# Patient Record
Sex: MALE | URBAN - METROPOLITAN AREA
[De-identification: names, ages, dates, MRNs, and addresses within clinical notes are randomized per-mention and may not be internally consistent; named-entity substitution may affect disease eponyms.]

---

## 2020-08-12 ENCOUNTER — APPOINTMENT (RX ONLY)
Dept: URBAN - METROPOLITAN AREA CLINIC 37 | Facility: CLINIC | Age: 70
Setting detail: DERMATOLOGY
End: 2020-08-12

## 2020-08-12 DIAGNOSIS — L82.1 OTHER SEBORRHEIC KERATOSIS: ICD-10-CM

## 2020-08-12 DIAGNOSIS — D485 NEOPLASM OF UNCERTAIN BEHAVIOR OF SKIN: ICD-10-CM

## 2020-08-12 DIAGNOSIS — D22 MELANOCYTIC NEVI: ICD-10-CM

## 2020-08-12 DIAGNOSIS — D18.0 HEMANGIOMA: ICD-10-CM

## 2020-08-12 PROBLEM — D48.5 NEOPLASM OF UNCERTAIN BEHAVIOR OF SKIN: Status: ACTIVE | Noted: 2020-08-12

## 2020-08-12 PROBLEM — D22.5 MELANOCYTIC NEVI OF TRUNK: Status: ACTIVE | Noted: 2020-08-12

## 2020-08-12 PROBLEM — D18.01 HEMANGIOMA OF SKIN AND SUBCUTANEOUS TISSUE: Status: ACTIVE | Noted: 2020-08-12

## 2020-08-12 PROCEDURE — 99203 OFFICE O/P NEW LOW 30 MIN: CPT | Mod: 25

## 2020-08-12 PROCEDURE — ? BIOPSY BY SHAVE METHOD

## 2020-08-12 PROCEDURE — 11102 TANGNTL BX SKIN SINGLE LES: CPT

## 2020-08-12 PROCEDURE — ? COUNSELING

## 2020-08-12 ASSESSMENT — LOCATION DETAILED DESCRIPTION DERM
LOCATION DETAILED: RIGHT INFERIOR MEDIAL MIDBACK
LOCATION DETAILED: RIGHT SUPERIOR UPPER BACK
LOCATION DETAILED: RIGHT SUPERIOR FOREHEAD
LOCATION DETAILED: LEFT MID-UPPER BACK

## 2020-08-12 ASSESSMENT — LOCATION SIMPLE DESCRIPTION DERM
LOCATION SIMPLE: RIGHT FOREHEAD
LOCATION SIMPLE: RIGHT UPPER BACK
LOCATION SIMPLE: LEFT UPPER BACK
LOCATION SIMPLE: RIGHT LOWER BACK

## 2020-08-12 ASSESSMENT — LOCATION ZONE DERM
LOCATION ZONE: TRUNK
LOCATION ZONE: FACE

## 2020-08-24 ENCOUNTER — APPOINTMENT (RX ONLY)
Dept: URBAN - METROPOLITAN AREA CLINIC 38 | Facility: CLINIC | Age: 70
Setting detail: DERMATOLOGY
End: 2020-08-24

## 2020-08-24 PROBLEM — C44.319 BASAL CELL CARCINOMA OF SKIN OF OTHER PARTS OF FACE: Status: ACTIVE | Noted: 2020-08-24

## 2020-08-24 PROCEDURE — 13132 CMPLX RPR F/C/C/M/N/AX/G/H/F: CPT | Mod: 59

## 2020-08-24 PROCEDURE — ? COUNSELING

## 2020-08-24 PROCEDURE — 15240 FTH/GFT F/C/C/M/N/AX/G/H/F20: CPT

## 2020-08-24 PROCEDURE — 17312 MOHS ADDL STAGE: CPT

## 2020-08-24 PROCEDURE — ? MOHS SURGERY

## 2020-08-24 PROCEDURE — 17311 MOHS 1 STAGE H/N/HF/G: CPT

## 2020-08-24 PROCEDURE — ? ADDITIONAL NOTES

## 2020-08-24 NOTE — HPI: MOHS SURGERY CONSULTATION
Has The Cancer Been Biopsied Before?: has been previously biopsied
Body Location Override (Optional): Right superior central forehead
When Was Your Biopsy?: 08/12/20
Who Is Your Referring Provider?: Dr. Flores

## 2020-09-02 ENCOUNTER — APPOINTMENT (RX ONLY)
Dept: URBAN - METROPOLITAN AREA CLINIC 38 | Facility: CLINIC | Age: 70
Setting detail: DERMATOLOGY
End: 2020-09-02

## 2020-09-02 DIAGNOSIS — Z48.02 ENCOUNTER FOR REMOVAL OF SUTURES: ICD-10-CM

## 2020-09-02 PROCEDURE — ? SUTURE REMOVAL (GLOBAL PERIOD)

## 2020-09-02 PROCEDURE — ? ADDITIONAL NOTES

## 2020-09-02 PROCEDURE — 99024 POSTOP FOLLOW-UP VISIT: CPT

## 2020-09-02 ASSESSMENT — LOCATION DETAILED DESCRIPTION DERM: LOCATION DETAILED: SUPERIOR MID FOREHEAD

## 2020-09-02 ASSESSMENT — LOCATION SIMPLE DESCRIPTION DERM: LOCATION SIMPLE: SUPERIOR FOREHEAD

## 2020-09-02 ASSESSMENT — LOCATION ZONE DERM: LOCATION ZONE: FACE

## 2020-09-02 NOTE — PROCEDURE: SUTURE REMOVAL (GLOBAL PERIOD)
Body Location Override (Optional - Billing Will Still Be Based On Selected Body Map Location If Applicable): right superior central forehead
Detail Level: Detailed
Add 37281 Cpt? (Important Note: In 2017 The Use Of 31106 Is Being Tracked By Cms To Determine Future Global Period Reimbursement For Global Periods): yes

## 2020-09-21 ENCOUNTER — APPOINTMENT (RX ONLY)
Dept: URBAN - METROPOLITAN AREA CLINIC 38 | Facility: CLINIC | Age: 70
Setting detail: DERMATOLOGY
End: 2020-09-21

## 2020-09-21 DIAGNOSIS — L90.5 SCAR CONDITIONS AND FIBROSIS OF SKIN: ICD-10-CM

## 2020-09-21 PROCEDURE — 99024 POSTOP FOLLOW-UP VISIT: CPT

## 2020-09-21 PROCEDURE — ? COUNSELING

## 2020-09-21 PROCEDURE — ? OTHER

## 2020-09-21 PROCEDURE — ? ADDITIONAL NOTES

## 2020-09-21 NOTE — PROCEDURE: OTHER
Other (Free Text): Well healed scar with central crust.  Crust removed, almost healed underneath.  Vaseline, massage, wound care, sunscreen.
Note Text (......Xxx Chief Complaint.): This diagnosis correlates with the
Detail Level: Zone

## 2023-06-23 NOTE — PROCEDURE: MOHS SURGERY
code stemi activated, notified Dr. Ramirez.  patient now to be sent to cath lab. Modified Advancement Flap Text: The defect edges were debeveled with a #15 scalpel blade.  Given the location of the defect, shape of the defect and the proximity to free margins a modified advancement flap was deemed most appropriate.  Using a sterile surgical marker, an appropriate advancement flap was drawn incorporating the defect and placing the expected incisions within the relaxed skin tension lines where possible.    The area thus outlined was incised deep to adipose tissue with a #15 scalpel blade.  The skin margins were undermined to an appropriate distance in all directions utilizing iris scissors.

## 2023-10-08 NOTE — PROCEDURE: MOHS SURGERY
- - - Suturegard Intro: Intraoperative tissue expansion was performed, utilizing the SUTUREGARD device, in order to reduce wound tension.
